# Patient Record
Sex: FEMALE | Race: WHITE | NOT HISPANIC OR LATINO | Employment: FULL TIME | URBAN - METROPOLITAN AREA
[De-identification: names, ages, dates, MRNs, and addresses within clinical notes are randomized per-mention and may not be internally consistent; named-entity substitution may affect disease eponyms.]

---

## 2017-03-03 ENCOUNTER — HOSPITAL ENCOUNTER (EMERGENCY)
Facility: HOSPITAL | Age: 47
Discharge: HOME | End: 2017-03-03
Payer: OTHER MISCELLANEOUS

## 2017-03-03 VITALS
OXYGEN SATURATION: 97 % | WEIGHT: 180 LBS | DIASTOLIC BLOOD PRESSURE: 83 MMHG | RESPIRATION RATE: 18 BRPM | SYSTOLIC BLOOD PRESSURE: 129 MMHG | TEMPERATURE: 97.3 F | HEART RATE: 75 BPM | HEIGHT: 64 IN | BODY MASS INDEX: 30.73 KG/M2

## 2017-03-03 DIAGNOSIS — S05.01XA RIGHT CORNEAL ABRASION: Primary | ICD-10-CM

## 2017-03-03 PROCEDURE — 99283 EMERGENCY DEPT VISIT LOW MDM: CPT

## 2017-03-03 RX ORDER — ESZOPICLONE 3 MG/1
3 TABLET, FILM COATED ORAL
COMMUNITY

## 2017-03-03 RX ORDER — PROPARACAINE HYDROCHLORIDE 5 MG/ML
1 SOLUTION/ DROPS OPHTHALMIC ONCE
Status: COMPLETED | OUTPATIENT
Start: 2017-03-03 | End: 2017-03-03

## 2017-03-03 RX ORDER — TOBRAMYCIN 3 MG/ML
1 SOLUTION/ DROPS OPHTHALMIC
Qty: 1.8 ML | Refills: 0 | Status: SHIPPED | OUTPATIENT
Start: 2017-03-03 | End: 2017-03-10

## 2017-03-03 RX ORDER — PURIFIED WATER 986 MG/ML
SOLUTION OPHTHALMIC ONCE
Status: COMPLETED | OUTPATIENT
Start: 2017-03-03 | End: 2017-03-03

## 2017-03-03 RX ADMIN — PURIFIED WATER: 986 SOLUTION OPHTHALMIC at 11:13

## 2017-03-03 RX ADMIN — FLUORESCEIN SODIUM 1 STRIP: 1 STRIP OPHTHALMIC at 11:12

## 2017-03-03 RX ADMIN — PROPARACAINE HYDROCHLORIDE 1 DROP: 5 SOLUTION/ DROPS OPHTHALMIC at 11:12

## 2019-12-21 ENCOUNTER — APPOINTMENT (OUTPATIENT)
Dept: RADIOLOGY | Facility: CLINIC | Age: 49
End: 2019-12-21
Payer: COMMERCIAL

## 2019-12-21 ENCOUNTER — OFFICE VISIT (OUTPATIENT)
Dept: URGENT CARE | Facility: CLINIC | Age: 49
End: 2019-12-21
Payer: COMMERCIAL

## 2019-12-21 VITALS
RESPIRATION RATE: 16 BRPM | BODY MASS INDEX: 32.44 KG/M2 | OXYGEN SATURATION: 100 % | WEIGHT: 190 LBS | DIASTOLIC BLOOD PRESSURE: 78 MMHG | HEART RATE: 80 BPM | SYSTOLIC BLOOD PRESSURE: 128 MMHG | HEIGHT: 64 IN | TEMPERATURE: 98.2 F

## 2019-12-21 DIAGNOSIS — M25.561 ACUTE PAIN OF RIGHT KNEE: Primary | ICD-10-CM

## 2019-12-21 DIAGNOSIS — M25.561 ACUTE PAIN OF RIGHT KNEE: ICD-10-CM

## 2019-12-21 PROCEDURE — 73562 X-RAY EXAM OF KNEE 3: CPT

## 2019-12-21 PROCEDURE — G0382 LEV 3 HOSP TYPE B ED VISIT: HCPCS | Performed by: EMERGENCY MEDICINE

## 2019-12-21 RX ORDER — NAPROXEN 375 MG/1
375 TABLET ORAL
Qty: 21 TABLET | Refills: 0 | Status: SHIPPED | OUTPATIENT
Start: 2019-12-21 | End: 2020-05-19

## 2019-12-21 NOTE — PATIENT INSTRUCTIONS
Rest, wear immobilizer when up and about, Naprosyn 3 x a day, see orthopedists for follow-up next week

## 2019-12-21 NOTE — PROGRESS NOTES
Assessment/Plan:    No problem-specific Assessment & Plan notes found for this encounter  Diagnoses and all orders for this visit:    Acute pain of right knee  -     XR knee 3 vw right non injury; Future          Subjective:      Patient ID: Dev Marc is a 44 y o  female  Pain in R knee, progressively worsening over past few weeks; Tender at medial jointline, No history of trauma  Minimal swelling    Knee Pain    The incident occurred more than 1 week ago  There was no injury mechanism  The pain is present in the right knee  The quality of the pain is described as aching  The pain is at a severity of 3/10  The pain is mild  The pain has been fluctuating since onset  Associated symptoms include an inability to bear weight  She reports no foreign bodies present  The symptoms are aggravated by movement  She has tried rest for the symptoms  The treatment provided mild relief  The following portions of the patient's history were reviewed and updated as appropriate: allergies, current medications, past family history, past medical history, past social history, past surgical history and problem list     Review of Systems   Musculoskeletal: Positive for arthralgias (R knee)  All other systems reviewed and are negative  Objective:      /78   Pulse 80   Temp 98 2 °F (36 8 °C)   Resp 16   Ht 5' 4" (1 626 m)   Wt 86 2 kg (190 lb)   SpO2 100%   BMI 32 61 kg/m²          Physical Exam   Constitutional: She is oriented to person, place, and time  She appears well-developed and well-nourished  HENT:   Nose: Nose normal    Eyes: Pupils are equal, round, and reactive to light  Neck: Normal range of motion  Cardiovascular: Normal rate  Pulmonary/Chest: Effort normal    Abdominal: Soft  Musculoskeletal:        Right knee: She exhibits swelling  Tenderness found  Medial joint line and MCL tenderness noted  Neurological: She is alert and oriented to person, place, and time     Skin: Skin is warm and dry  Psychiatric: She has a normal mood and affect  Her behavior is normal  Judgment and thought content normal    Nursing note and vitals reviewed

## 2020-01-28 ENCOUNTER — OFFICE VISIT (OUTPATIENT)
Dept: OBGYN CLINIC | Facility: CLINIC | Age: 50
End: 2020-01-28
Payer: COMMERCIAL

## 2020-01-28 VITALS
BODY MASS INDEX: 32.44 KG/M2 | HEART RATE: 65 BPM | DIASTOLIC BLOOD PRESSURE: 68 MMHG | WEIGHT: 190 LBS | HEIGHT: 64 IN | SYSTOLIC BLOOD PRESSURE: 120 MMHG

## 2020-01-28 DIAGNOSIS — M23.91 INTERNAL DERANGEMENT OF KNEE JOINT, RIGHT: Primary | ICD-10-CM

## 2020-01-28 PROCEDURE — 99203 OFFICE O/P NEW LOW 30 MIN: CPT | Performed by: ORTHOPAEDIC SURGERY

## 2020-01-28 NOTE — PROGRESS NOTES
Assessment:     1  Internal derangement of knee joint, right        Plan:     Problem List Items Addressed This Visit        Musculoskeletal and Integument    Internal derangement of knee joint, right - Primary     Findings and exam suspicious for medial meniscal tear of right knee  Medial joint line tenderness, + medial Maxwell  Will get MRI to further evaluate if she has medial menisca tear  If she has tear she may need arthroscopy, if no tear is present then would proceed with possible CSI, physical therapy  Ice and heat as needed, OTC NSAIDs avoid activities that aggravate the knee  See back for MRI review  All patient's questions were answered to her satisfaction  This note is created using dictation transcription  It may contain typographical errors, grammatical errors, improperly dictated words, background noise and other errors  Relevant Orders    MRI knee right  wo contrast          Subjective:     Patient ID: Nestor Howard is a 44 y o  female  Chief Complaint:  44 yr old female in for evaluation of right knee pain  Saw urgent care 21/21/20 due to increasing knee pain  Pain ongoing since last fall with no injury or trauma gradually worsening  Pain is medial based, worse with activity such as walking, stairs, squatting, pivoting on planted foot  Pain can be dull ache at rest, sharp pain with certain motions  No locking or catching  Pain will wake from sleep  She has had no formal treatment  Denies numbness or tingling in leg  Information on patient's intake form was reviewed  Allergy:  Allergies   Allergen Reactions    Percolone [Oxycodone] Itching     Medications:  all current active meds have been reviewed  Past Medical History:  History reviewed  No pertinent past medical history  Past Surgical History:  History reviewed  No pertinent surgical history  Family History:  History reviewed  No pertinent family history    Social History:  Social History     Substance and Sexual Activity Alcohol Use No     Social History     Substance and Sexual Activity   Drug Use No     Social History     Tobacco Use   Smoking Status Current Every Day Smoker    Packs/day: 0 50    Types: Cigarettes   Smokeless Tobacco Never Used     Review of Systems   Constitutional: Negative for chills and fever  HENT: Negative for drooling and sneezing  Eyes: Negative for redness  Respiratory: Negative for cough and wheezing  Cardiovascular: Negative  Gastrointestinal: Negative for nausea and vomiting  Genitourinary: Negative  Musculoskeletal: Positive for arthralgias (Right knee) and gait problem (Antalgic)  Negative for joint swelling  Neurological: Negative for weakness and numbness  Psychiatric/Behavioral: The patient is not nervous/anxious  Objective:  BP Readings from Last 1 Encounters:   01/28/20 120/68      Wt Readings from Last 1 Encounters:   01/28/20 86 2 kg (190 lb)      BMI:   Estimated body mass index is 32 61 kg/m² as calculated from the following:    Height as of this encounter: 5' 4" (1 626 m)  Weight as of this encounter: 86 2 kg (190 lb)  BSA:   Estimated body surface area is 1 91 meters squared as calculated from the following:    Height as of this encounter: 5' 4" (1 626 m)  Weight as of this encounter: 86 2 kg (190 lb)  Physical Exam   Constitutional: She is oriented to person, place, and time  She appears well-developed and well-nourished  HENT:   Head: Normocephalic and atraumatic  Eyes: Conjunctivae and EOM are normal    Neck: Neck supple  Pulmonary/Chest: Effort normal    Musculoskeletal:        Right knee: She exhibits no effusion  Neurological: She is alert and oriented to person, place, and time  She has normal reflexes  Skin: Skin is warm and dry  Psychiatric: She has a normal mood and affect  Her behavior is normal  Judgment and thought content normal    Nursing note and vitals reviewed      Right Knee Exam     Muscle Strength   The patient has normal right knee strength  Tenderness   The patient is experiencing tenderness in the medial joint line  Range of Motion   Extension: 0   Flexion: 130     Tests   Maxwell:  Medial - positive Lateral - negative  Varus: negative Valgus: negative  Patellar apprehension: negative    Other   Erythema: absent  Scars: absent  Sensation: normal  Pulse: present  Swelling: none  Effusion: no effusion present    Comments: Well tracking patella             I have personally reviewed pertinent films in PACS and my interpretation is XR right knee no acute osseus abnormality, no significant degenerative changes, lateral joint chondrocalcinosis       Scribe Attestation    I,:   Rico Vega am acting as a scribe while in the presence of the attending physician :        I,:   Ernestina Santoyo MD personally performed the services described in this documentation    as scribed in my presence :

## 2020-01-28 NOTE — ASSESSMENT & PLAN NOTE
Findings and exam suspicious for medial meniscal tear of right knee  Medial joint line tenderness, + medial Maxwell  Will get MRI to further evaluate if she has medial menisca tear  If she has tear she may need arthroscopy, if no tear is present then would proceed with possible CSI, physical therapy  Ice and heat as needed, OTC NSAIDs avoid activities that aggravate the knee  See back for MRI review  All patient's questions were answered to her satisfaction  This note is created using dictation transcription  It may contain typographical errors, grammatical errors, improperly dictated words, background noise and other errors

## 2020-02-03 ENCOUNTER — HOSPITAL ENCOUNTER (OUTPATIENT)
Dept: MRI IMAGING | Facility: HOSPITAL | Age: 50
Discharge: HOME | End: 2020-02-03
Attending: ORTHOPAEDIC SURGERY
Payer: COMMERCIAL

## 2020-02-03 DIAGNOSIS — M23.91 INTERNAL DERANGEMENT OF KNEE JOINT, RIGHT: ICD-10-CM

## 2020-02-03 PROCEDURE — 73721 MRI JNT OF LWR EXTRE W/O DYE: CPT

## 2020-02-06 ENCOUNTER — OFFICE VISIT (OUTPATIENT)
Dept: OBGYN CLINIC | Facility: CLINIC | Age: 50
End: 2020-02-06
Payer: COMMERCIAL

## 2020-02-06 VITALS
HEART RATE: 72 BPM | SYSTOLIC BLOOD PRESSURE: 120 MMHG | WEIGHT: 190 LBS | BODY MASS INDEX: 32.44 KG/M2 | DIASTOLIC BLOOD PRESSURE: 80 MMHG | HEIGHT: 64 IN

## 2020-02-06 DIAGNOSIS — M17.11 PRIMARY OSTEOARTHRITIS OF RIGHT KNEE: Primary | ICD-10-CM

## 2020-02-06 PROCEDURE — 20610 DRAIN/INJ JOINT/BURSA W/O US: CPT | Performed by: ORTHOPAEDIC SURGERY

## 2020-02-06 PROCEDURE — 99213 OFFICE O/P EST LOW 20 MIN: CPT | Performed by: ORTHOPAEDIC SURGERY

## 2020-02-06 RX ORDER — BETAMETHASONE SODIUM PHOSPHATE AND BETAMETHASONE ACETATE 3; 3 MG/ML; MG/ML
6 INJECTION, SUSPENSION INTRA-ARTICULAR; INTRALESIONAL; INTRAMUSCULAR; SOFT TISSUE
Status: COMPLETED | OUTPATIENT
Start: 2020-02-06 | End: 2020-02-06

## 2020-02-06 RX ORDER — LIDOCAINE HYDROCHLORIDE 10 MG/ML
7 INJECTION, SOLUTION EPIDURAL; INFILTRATION; INTRACAUDAL; PERINEURAL
Status: COMPLETED | OUTPATIENT
Start: 2020-02-06 | End: 2020-02-06

## 2020-02-06 RX ADMIN — BETAMETHASONE SODIUM PHOSPHATE AND BETAMETHASONE ACETATE 6 MG: 3; 3 INJECTION, SUSPENSION INTRA-ARTICULAR; INTRALESIONAL; INTRAMUSCULAR; SOFT TISSUE at 16:13

## 2020-02-06 RX ADMIN — LIDOCAINE HYDROCHLORIDE 7 ML: 10 INJECTION, SOLUTION EPIDURAL; INFILTRATION; INTRACAUDAL; PERINEURAL at 16:13

## 2020-02-06 NOTE — ASSESSMENT & PLAN NOTE
Findings consistent with right knee osteoarthritis possible degenerative medial meniscal tear  Discussed findings and treatment options with the patient  I reviewed patient's right knee MRI with her  I discussed prognosis of her knee condition  Since the MRI did not show a definitive meniscus tear other than signal changes  Patient would like to try conservative treatment with cortisone injection which I provided for her today and she tolerated well  Patient appeared to have good pain relief after the injection  We will see how patient responds to the treatment  I advised patient to avoid activities that may aggravate her knee  If patient continued to have mechanical symptoms, she may have to consider surgical treatment  Cold compress over the right knee today  All patient's questions were answered to her satisfaction  This note is created using dictation transcription  It may contain typographical errors, grammatical errors, improperly dictated words, background noise and other errors

## 2020-02-06 NOTE — PROGRESS NOTES
Assessment:     1  Primary osteoarthritis of right knee        Plan:     Problem List Items Addressed This Visit        Musculoskeletal and Integument    Primary osteoarthritis of right knee - Primary     Findings consistent with right knee osteoarthritis possible degenerative medial meniscal tear  Discussed findings and treatment options with the patient  I reviewed patient's right knee MRI with her  I discussed prognosis of her knee condition  Since the MRI did not show a definitive meniscus tear other than signal changes  Patient would like to try conservative treatment with cortisone injection which I provided for her today and she tolerated well  Patient appeared to have good pain relief after the injection  We will see how patient responds to the treatment  I advised patient to avoid activities that may aggravate her knee  If patient continued to have mechanical symptoms, she may have to consider surgical treatment  Cold compress over the right knee today  All patient's questions were answered to her satisfaction  This note is created using dictation transcription  It may contain typographical errors, grammatical errors, improperly dictated words, background noise and other errors  Relevant Medications    lidocaine (PF) (XYLOCAINE-MPF) 1 % injection 7 mL (Completed)    betamethasone acetate-betamethasone sodium phosphate (CELESTONE) injection 6 mg (Completed)    Other Relevant Orders    Ambulatory referral to Physical Therapy    Large joint arthrocentesis: R knee (Completed)         Subjective:     Patient ID: Maurice Arizmendi is a 44 y o  female  Chief Complaint:  66-year-old female follow-up right knee pain  Patient is here today to review her knee MRI  She continued to experiencing pain along the inner aspect of her right knee  She denies locking but has sensation of giving way due to pain  Pain is sharp at times    She has difficulty with squatting, kneeling, and climbing  Allergy:  Allergies   Allergen Reactions    Percolone [Oxycodone] Itching     Medications:  all current active meds have been reviewed  Past Medical History:  History reviewed  No pertinent past medical history  Past Surgical History:  History reviewed  No pertinent surgical history  Family History:  Family History   Problem Relation Age of Onset    No Known Problems Mother     No Known Problems Father      Social History:  Social History     Substance and Sexual Activity   Alcohol Use No     Social History     Substance and Sexual Activity   Drug Use No     Social History     Tobacco Use   Smoking Status Current Every Day Smoker    Packs/day: 0 50    Types: Cigarettes   Smokeless Tobacco Never Used     Review of Systems   Constitutional: Negative  HENT: Negative  Eyes: Negative  Respiratory: Negative  Cardiovascular: Negative  Gastrointestinal: Negative  Endocrine: Negative  Genitourinary: Negative  Musculoskeletal: Positive for arthralgias (right knee)  Negative for gait problem and joint swelling  Skin: Negative  Allergic/Immunologic: Negative  Neurological: Negative  Hematological: Negative  Psychiatric/Behavioral: Negative  Objective:  BP Readings from Last 1 Encounters:   02/06/20 120/80      Wt Readings from Last 1 Encounters:   02/06/20 86 2 kg (190 lb)      BMI:   Estimated body mass index is 32 61 kg/m² as calculated from the following:    Height as of this encounter: 5' 4" (1 626 m)  Weight as of this encounter: 86 2 kg (190 lb)  BSA:   Estimated body surface area is 1 91 meters squared as calculated from the following:    Height as of this encounter: 5' 4" (1 626 m)  Weight as of this encounter: 86 2 kg (190 lb)  Physical Exam   Constitutional: She is oriented to person, place, and time  She appears well-developed  HENT:   Head: Normocephalic and atraumatic  Eyes: Conjunctivae and EOM are normal    Neck: Neck supple  Pulmonary/Chest: Effort normal    Musculoskeletal:        Right knee: She exhibits no effusion  Neurological: She is alert and oriented to person, place, and time  Skin: Skin is warm  Psychiatric: She has a normal mood and affect  Nursing note and vitals reviewed  Right Knee Exam     Muscle Strength   The patient has normal right knee strength  Tenderness   The patient is experiencing tenderness in the medial joint line  Range of Motion   The patient has normal right knee ROM  Tests   Maxwell:  Medial - positive Lateral - negative  Varus: negative Valgus: negative  Patellar apprehension: negative    Other   Erythema: absent  Sensation: normal  Pulse: present  Swelling: none  Effusion: no effusion present              I have personally reviewed pertinent films in PACS and my interpretation is Right knee MRI show degenerative changes over the medial and patellofemoral compartment  There is slight signal changes over the body of medial meniscus       Large joint arthrocentesis: R knee  Date/Time: 2/6/2020 4:13 PM  Consent given by: patient  Site marked: site marked  Timeout: Immediately prior to procedure a time out was called to verify the correct patient, procedure, equipment, support staff and site/side marked as required   Supporting Documentation  Indications: pain   Procedure Details  Location: knee - R knee  Preparation: Patient was prepped and draped in the usual sterile fashion  Needle size: 22 G  Ultrasound guidance: no  Approach: anterolateral  Medications administered: 7 mL lidocaine (PF) 1 %; 6 mg betamethasone acetate-betamethasone sodium phosphate 6 (3-3) mg/mL    Patient tolerance: patient tolerated the procedure well with no immediate complications  Dressing:  Sterile dressing applied

## 2020-03-05 ENCOUNTER — OFFICE VISIT (OUTPATIENT)
Dept: OBGYN CLINIC | Facility: CLINIC | Age: 50
End: 2020-03-05
Payer: COMMERCIAL

## 2020-03-05 DIAGNOSIS — M23.203 DEGENERATIVE TEAR OF MEDIAL MENISCUS OF RIGHT KNEE: Primary | ICD-10-CM

## 2020-03-05 PROCEDURE — 99215 OFFICE O/P EST HI 40 MIN: CPT | Performed by: ORTHOPAEDIC SURGERY

## 2020-03-06 PROBLEM — M23.203 DEGENERATIVE TEAR OF MEDIAL MENISCUS OF RIGHT KNEE: Status: ACTIVE | Noted: 2020-03-06

## 2020-03-06 RX ORDER — CHLORHEXIDINE GLUCONATE 4 G/100ML
SOLUTION TOPICAL DAILY PRN
Status: CANCELLED | OUTPATIENT
Start: 2020-03-06

## 2020-03-06 NOTE — ASSESSMENT & PLAN NOTE
Findings consistent with right knee medial meniscus tear  Findings and treatment options were discussed with the patient  Patient had minimal relief with the cortisone injection  She continues to have mechanical symptoms in her right knee  Recommend right knee arthroscopy with partial medial meniscectomy  Risks, benefits and complications of surgery were discussed in detail by Dr Mary Justice and informed consent was obtained  All questions were answered to patient's satisfaction

## 2020-03-06 NOTE — PROGRESS NOTES
Assessment:     1  Degenerative tear of medial meniscus of right knee        Plan:  The patient was seen and examined by Dr Lj Chaney and myself  Problem List Items Addressed This Visit        Musculoskeletal and Integument    Degenerative tear of medial meniscus of right knee - Primary     Findings consistent with right knee medial meniscus tear  Findings and treatment options were discussed with the patient  Patient had minimal relief with the cortisone injection  She continues to have mechanical symptoms in her right knee  Recommend right knee arthroscopy with partial medial meniscectomy  Risks, benefits and complications of surgery were discussed in detail by Dr Lj Chaney and informed consent was obtained  All questions were answered to patient's satisfaction  Relevant Orders    Case request operating room: ARTHROSCOPY KNEE (Completed)    Comprehensive metabolic panel    CBC and differential         Subjective:     Patient ID: Anisa Otoole is a 44 y o  female  Chief Complaint:  61-year-old female following up for right knee osteoarthritis and possible medial meniscus tear  She was given a cortisone injection last visit and states she only had 24 hours a mild relief  She continues to have sharp pain over the medial aspect of her knee  It is worse with any squatting and twisting  Allergy:  Allergies   Allergen Reactions    Percolone [Oxycodone] Itching     Medications:  all current active meds have been reviewed  Past Medical History:  No past medical history on file  Past Surgical History:  No past surgical history on file    Family History:  Family History   Problem Relation Age of Onset    No Known Problems Mother     No Known Problems Father      Social History:  Social History     Substance and Sexual Activity   Alcohol Use No     Social History     Substance and Sexual Activity   Drug Use No     Social History     Tobacco Use   Smoking Status Current Every Day Smoker    Packs/day: 0 50  Types: Cigarettes   Smokeless Tobacco Never Used     Review of Systems   Constitutional: Negative  HENT: Negative  Eyes: Negative  Respiratory: Negative  Cardiovascular: Negative  Gastrointestinal: Negative  Endocrine: Negative  Genitourinary: Negative  Musculoskeletal: Positive for arthralgias (right knee)  Negative for gait problem and joint swelling  Skin: Negative  Allergic/Immunologic: Negative  Neurological: Negative  Hematological: Negative  Psychiatric/Behavioral: Negative  Objective:  BP Readings from Last 1 Encounters:   02/06/20 120/80      Wt Readings from Last 1 Encounters:   02/06/20 86 2 kg (190 lb)      BMI:   Estimated body mass index is 32 61 kg/m² as calculated from the following:    Height as of 2/6/20: 5' 4" (1 626 m)  Weight as of 2/6/20: 86 2 kg (190 lb)  BSA:   Estimated body surface area is 1 91 meters squared as calculated from the following:    Height as of 2/6/20: 5' 4" (1 626 m)  Weight as of 2/6/20: 86 2 kg (190 lb)  Physical Exam   Constitutional: She is oriented to person, place, and time  She appears well-developed  HENT:   Head: Normocephalic and atraumatic  Eyes: Conjunctivae and EOM are normal    Neck: Neck supple  Cardiovascular: Normal rate, regular rhythm and normal heart sounds  No murmur heard  Pulmonary/Chest: Effort normal and breath sounds normal  No respiratory distress  Musculoskeletal:        Right knee: She exhibits no effusion  Neurological: She is alert and oriented to person, place, and time  Skin: Skin is warm  Psychiatric: She has a normal mood and affect  Nursing note and vitals reviewed  Right Knee Exam     Muscle Strength   The patient has normal right knee strength  Tenderness   The patient is experiencing tenderness in the medial joint line  Range of Motion   The patient has normal right knee ROM      Tests   Maxwell:  Medial - positive Lateral - negative  Varus: negative Valgus: negative  Patellar apprehension: negative    Other   Erythema: absent  Sensation: normal  Pulse: present  Swelling: none  Effusion: no effusion present              No imaging today       Procedures

## 2020-03-25 ENCOUNTER — ANESTHESIA EVENT (OUTPATIENT)
Dept: PERIOP | Facility: HOSPITAL | Age: 50
End: 2020-03-25
Payer: COMMERCIAL

## 2020-03-27 ENCOUNTER — TELEPHONE (OUTPATIENT)
Dept: OBGYN CLINIC | Facility: CLINIC | Age: 50
End: 2020-03-27

## 2020-04-03 ENCOUNTER — ANESTHESIA (OUTPATIENT)
Dept: PERIOP | Facility: HOSPITAL | Age: 50
End: 2020-04-03
Payer: COMMERCIAL

## 2020-05-12 ENCOUNTER — OFFICE VISIT (OUTPATIENT)
Dept: OBGYN CLINIC | Facility: CLINIC | Age: 50
End: 2020-05-12
Payer: COMMERCIAL

## 2020-05-12 VITALS
DIASTOLIC BLOOD PRESSURE: 70 MMHG | SYSTOLIC BLOOD PRESSURE: 125 MMHG | TEMPERATURE: 97.8 F | BODY MASS INDEX: 32.44 KG/M2 | WEIGHT: 190 LBS | HEIGHT: 64 IN

## 2020-05-12 DIAGNOSIS — Z01.818 PREOP TESTING: ICD-10-CM

## 2020-05-12 DIAGNOSIS — M23.203 DEGENERATIVE TEAR OF MEDIAL MENISCUS OF RIGHT KNEE: Primary | ICD-10-CM

## 2020-05-12 PROCEDURE — 99215 OFFICE O/P EST HI 40 MIN: CPT | Performed by: ORTHOPAEDIC SURGERY

## 2020-05-15 ENCOUNTER — APPOINTMENT (OUTPATIENT)
Dept: LAB | Facility: HOSPITAL | Age: 50
End: 2020-05-15
Payer: COMMERCIAL

## 2020-05-15 DIAGNOSIS — M23.203 DEGENERATIVE TEAR OF MEDIAL MENISCUS OF RIGHT KNEE: ICD-10-CM

## 2020-05-15 DIAGNOSIS — Z01.818 PREOP TESTING: ICD-10-CM

## 2020-05-15 LAB
ALBUMIN SERPL BCP-MCNC: 3.9 G/DL (ref 3.5–5)
ALP SERPL-CCNC: 73 U/L (ref 46–116)
ALT SERPL W P-5'-P-CCNC: 40 U/L (ref 12–78)
ANION GAP SERPL CALCULATED.3IONS-SCNC: 3 MMOL/L (ref 4–13)
AST SERPL W P-5'-P-CCNC: 23 U/L (ref 5–45)
BASOPHILS # BLD AUTO: 0.05 THOUSANDS/ΜL (ref 0–0.1)
BASOPHILS NFR BLD AUTO: 1 % (ref 0–1)
BILIRUB SERPL-MCNC: 0.26 MG/DL (ref 0.2–1)
BUN SERPL-MCNC: 15 MG/DL (ref 5–25)
CALCIUM SERPL-MCNC: 9 MG/DL (ref 8.3–10.1)
CHLORIDE SERPL-SCNC: 108 MMOL/L (ref 100–108)
CO2 SERPL-SCNC: 27 MMOL/L (ref 21–32)
CREAT SERPL-MCNC: 0.76 MG/DL (ref 0.6–1.3)
EOSINOPHIL # BLD AUTO: 0.14 THOUSAND/ΜL (ref 0–0.61)
EOSINOPHIL NFR BLD AUTO: 2 % (ref 0–6)
ERYTHROCYTE [DISTWIDTH] IN BLOOD BY AUTOMATED COUNT: 13.1 % (ref 11.6–15.1)
GFR SERPL CREATININE-BSD FRML MDRD: 90 ML/MIN/1.73SQ M
GLUCOSE P FAST SERPL-MCNC: 119 MG/DL (ref 65–99)
HCT VFR BLD AUTO: 43.5 % (ref 34.8–46.1)
HGB BLD-MCNC: 13.6 G/DL (ref 11.5–15.4)
IMM GRANULOCYTES # BLD AUTO: 0.03 THOUSAND/UL (ref 0–0.2)
IMM GRANULOCYTES NFR BLD AUTO: 0 % (ref 0–2)
LYMPHOCYTES # BLD AUTO: 3.46 THOUSANDS/ΜL (ref 0.6–4.47)
LYMPHOCYTES NFR BLD AUTO: 42 % (ref 14–44)
MCH RBC QN AUTO: 27.4 PG (ref 26.8–34.3)
MCHC RBC AUTO-ENTMCNC: 31.3 G/DL (ref 31.4–37.4)
MCV RBC AUTO: 88 FL (ref 82–98)
MONOCYTES # BLD AUTO: 0.53 THOUSAND/ΜL (ref 0.17–1.22)
MONOCYTES NFR BLD AUTO: 6 % (ref 4–12)
NEUTROPHILS # BLD AUTO: 4.02 THOUSANDS/ΜL (ref 1.85–7.62)
NEUTS SEG NFR BLD AUTO: 49 % (ref 43–75)
NRBC BLD AUTO-RTO: 0 /100 WBCS
PLATELET # BLD AUTO: 306 THOUSANDS/UL (ref 149–390)
PMV BLD AUTO: 9.5 FL (ref 8.9–12.7)
POTASSIUM SERPL-SCNC: 4.7 MMOL/L (ref 3.5–5.3)
PROT SERPL-MCNC: 7.3 G/DL (ref 6.4–8.2)
RBC # BLD AUTO: 4.96 MILLION/UL (ref 3.81–5.12)
SODIUM SERPL-SCNC: 138 MMOL/L (ref 136–145)
WBC # BLD AUTO: 8.23 THOUSAND/UL (ref 4.31–10.16)

## 2020-05-15 PROCEDURE — 36415 COLL VENOUS BLD VENIPUNCTURE: CPT

## 2020-05-15 PROCEDURE — U0003 INFECTIOUS AGENT DETECTION BY NUCLEIC ACID (DNA OR RNA); SEVERE ACUTE RESPIRATORY SYNDROME CORONAVIRUS 2 (SARS-COV-2) (CORONAVIRUS DISEASE [COVID-19]), AMPLIFIED PROBE TECHNIQUE, MAKING USE OF HIGH THROUGHPUT TECHNOLOGIES AS DESCRIBED BY CMS-2020-01-R: HCPCS

## 2020-05-15 PROCEDURE — 85025 COMPLETE CBC W/AUTO DIFF WBC: CPT

## 2020-05-15 PROCEDURE — 80053 COMPREHEN METABOLIC PANEL: CPT

## 2020-05-16 LAB — SARS-COV-2 RNA RESP QL NAA+PROBE: NEGATIVE

## 2020-05-22 ENCOUNTER — HOSPITAL ENCOUNTER (OUTPATIENT)
Facility: HOSPITAL | Age: 50
Setting detail: OUTPATIENT SURGERY
Discharge: HOME | End: 2020-05-22
Attending: ORTHOPAEDIC SURGERY | Admitting: ORTHOPAEDIC SURGERY
Payer: COMMERCIAL

## 2020-05-22 VITALS
BODY MASS INDEX: 32.82 KG/M2 | SYSTOLIC BLOOD PRESSURE: 122 MMHG | HEIGHT: 65 IN | WEIGHT: 197 LBS | RESPIRATION RATE: 16 BRPM | TEMPERATURE: 97.5 F | HEART RATE: 67 BPM | OXYGEN SATURATION: 97 % | DIASTOLIC BLOOD PRESSURE: 78 MMHG

## 2020-05-22 DIAGNOSIS — M23.203 DEGENERATIVE TEAR OF MEDIAL MENISCUS OF RIGHT KNEE: Primary | ICD-10-CM

## 2020-05-22 LAB
EXT PREGNANCY TEST URINE: NEGATIVE
EXT. CONTROL: NORMAL

## 2020-05-22 PROCEDURE — 29881 ARTHRS KNE SRG MNISECTMY M/L: CPT | Performed by: ORTHOPAEDIC SURGERY

## 2020-05-22 PROCEDURE — NC001 PR NO CHARGE: Performed by: PHYSICIAN ASSISTANT

## 2020-05-22 PROCEDURE — 29881 ARTHRS KNE SRG MNISECTMY M/L: CPT | Performed by: PHYSICIAN ASSISTANT

## 2020-05-22 PROCEDURE — 81025 URINE PREGNANCY TEST: CPT | Performed by: ORTHOPAEDIC SURGERY

## 2020-05-22 RX ORDER — FENTANYL CITRATE/PF 50 MCG/ML
25 SYRINGE (ML) INJECTION
Status: DISCONTINUED | OUTPATIENT
Start: 2020-05-22 | End: 2020-05-22 | Stop reason: HOSPADM

## 2020-05-22 RX ORDER — PROPOFOL 10 MG/ML
INJECTION, EMULSION INTRAVENOUS AS NEEDED
Status: DISCONTINUED | OUTPATIENT
Start: 2020-05-22 | End: 2020-05-22 | Stop reason: SURG

## 2020-05-22 RX ORDER — CEFAZOLIN SODIUM 1 G/50ML
1000 SOLUTION INTRAVENOUS ONCE
Status: COMPLETED | OUTPATIENT
Start: 2020-05-22 | End: 2020-05-22

## 2020-05-22 RX ORDER — MIDAZOLAM HYDROCHLORIDE 2 MG/2ML
INJECTION, SOLUTION INTRAMUSCULAR; INTRAVENOUS AS NEEDED
Status: DISCONTINUED | OUTPATIENT
Start: 2020-05-22 | End: 2020-05-22 | Stop reason: SURG

## 2020-05-22 RX ORDER — DEXAMETHASONE SODIUM PHOSPHATE 10 MG/ML
INJECTION, SOLUTION INTRAMUSCULAR; INTRAVENOUS AS NEEDED
Status: DISCONTINUED | OUTPATIENT
Start: 2020-05-22 | End: 2020-05-22 | Stop reason: SURG

## 2020-05-22 RX ORDER — FENTANYL CITRATE 50 UG/ML
INJECTION, SOLUTION INTRAMUSCULAR; INTRAVENOUS AS NEEDED
Status: DISCONTINUED | OUTPATIENT
Start: 2020-05-22 | End: 2020-05-22 | Stop reason: SURG

## 2020-05-22 RX ORDER — HYDROCODONE BITARTRATE AND ACETAMINOPHEN 5; 325 MG/1; MG/1
1 TABLET ORAL EVERY 4 HOURS PRN
Qty: 20 TABLET | Refills: 0 | Status: SHIPPED | OUTPATIENT
Start: 2020-05-22 | End: 2020-06-01

## 2020-05-22 RX ORDER — ONDANSETRON 2 MG/ML
INJECTION INTRAMUSCULAR; INTRAVENOUS AS NEEDED
Status: DISCONTINUED | OUTPATIENT
Start: 2020-05-22 | End: 2020-05-22 | Stop reason: SURG

## 2020-05-22 RX ORDER — KETOROLAC TROMETHAMINE 30 MG/ML
INJECTION, SOLUTION INTRAMUSCULAR; INTRAVENOUS AS NEEDED
Status: DISCONTINUED | OUTPATIENT
Start: 2020-05-22 | End: 2020-05-22 | Stop reason: SURG

## 2020-05-22 RX ORDER — SODIUM CHLORIDE, SODIUM LACTATE, POTASSIUM CHLORIDE, CALCIUM CHLORIDE 600; 310; 30; 20 MG/100ML; MG/100ML; MG/100ML; MG/100ML
75 INJECTION, SOLUTION INTRAVENOUS CONTINUOUS
Status: DISCONTINUED | OUTPATIENT
Start: 2020-05-22 | End: 2020-05-22 | Stop reason: HOSPADM

## 2020-05-22 RX ORDER — ONDANSETRON 2 MG/ML
4 INJECTION INTRAMUSCULAR; INTRAVENOUS EVERY 6 HOURS PRN
Status: DISCONTINUED | OUTPATIENT
Start: 2020-05-22 | End: 2020-05-22 | Stop reason: HOSPADM

## 2020-05-22 RX ORDER — BUPIVACAINE HYDROCHLORIDE AND EPINEPHRINE 5; 5 MG/ML; UG/ML
INJECTION, SOLUTION EPIDURAL; INTRACAUDAL; PERINEURAL AS NEEDED
Status: DISCONTINUED | OUTPATIENT
Start: 2020-05-22 | End: 2020-05-22 | Stop reason: HOSPADM

## 2020-05-22 RX ORDER — ONDANSETRON 2 MG/ML
4 INJECTION INTRAMUSCULAR; INTRAVENOUS ONCE
Status: DISCONTINUED | OUTPATIENT
Start: 2020-05-22 | End: 2020-05-22 | Stop reason: HOSPADM

## 2020-05-22 RX ORDER — ACETAMINOPHEN 325 MG/1
650 TABLET ORAL EVERY 6 HOURS PRN
Status: DISCONTINUED | OUTPATIENT
Start: 2020-05-22 | End: 2020-05-22 | Stop reason: HOSPADM

## 2020-05-22 RX ORDER — METOCLOPRAMIDE HYDROCHLORIDE 5 MG/ML
10 INJECTION INTRAMUSCULAR; INTRAVENOUS ONCE
Status: DISCONTINUED | OUTPATIENT
Start: 2020-05-22 | End: 2020-05-22 | Stop reason: HOSPADM

## 2020-05-22 RX ORDER — CHLORHEXIDINE GLUCONATE 4 G/100ML
SOLUTION TOPICAL DAILY PRN
Status: DISCONTINUED | OUTPATIENT
Start: 2020-05-22 | End: 2020-05-22 | Stop reason: HOSPADM

## 2020-05-22 RX ADMIN — KETOROLAC TROMETHAMINE 30 MG: 30 INJECTION, SOLUTION INTRAMUSCULAR; INTRAVENOUS at 07:54

## 2020-05-22 RX ADMIN — ACETAMINOPHEN 650 MG: 325 TABLET ORAL at 09:05

## 2020-05-22 RX ADMIN — CEFAZOLIN SODIUM 1000 MG: 1 SOLUTION INTRAVENOUS at 07:29

## 2020-05-22 RX ADMIN — FENTANYL CITRATE 25 MCG: 50 INJECTION, SOLUTION INTRAMUSCULAR; INTRAVENOUS at 08:50

## 2020-05-22 RX ADMIN — ONDANSETRON 4 MG: 2 INJECTION INTRAMUSCULAR; INTRAVENOUS at 07:53

## 2020-05-22 RX ADMIN — DEXAMETHASONE SODIUM PHOSPHATE 4 MG: 10 INJECTION, SOLUTION INTRAMUSCULAR; INTRAVENOUS at 07:45

## 2020-05-22 RX ADMIN — FENTANYL CITRATE 25 MCG: 50 INJECTION, SOLUTION INTRAMUSCULAR; INTRAVENOUS at 08:55

## 2020-05-22 RX ADMIN — SODIUM CHLORIDE, SODIUM LACTATE, POTASSIUM CHLORIDE, AND CALCIUM CHLORIDE 75 ML/HR: .6; .31; .03; .02 INJECTION, SOLUTION INTRAVENOUS at 06:37

## 2020-05-22 RX ADMIN — FENTANYL CITRATE 25 MCG: 50 INJECTION, SOLUTION INTRAMUSCULAR; INTRAVENOUS at 07:45

## 2020-05-22 RX ADMIN — PROPOFOL 200 MG: 10 INJECTION, EMULSION INTRAVENOUS at 07:34

## 2020-05-22 RX ADMIN — MIDAZOLAM 2 MG: 1 INJECTION INTRAMUSCULAR; INTRAVENOUS at 07:29

## 2020-05-22 RX ADMIN — FENTANYL CITRATE 25 MCG: 50 INJECTION, SOLUTION INTRAMUSCULAR; INTRAVENOUS at 07:50

## 2020-05-22 RX ADMIN — FENTANYL CITRATE 50 MCG: 50 INJECTION, SOLUTION INTRAMUSCULAR; INTRAVENOUS at 07:29

## 2020-06-05 ENCOUNTER — OFFICE VISIT (OUTPATIENT)
Dept: OBGYN CLINIC | Facility: CLINIC | Age: 50
End: 2020-06-05

## 2020-06-05 VITALS
SYSTOLIC BLOOD PRESSURE: 118 MMHG | DIASTOLIC BLOOD PRESSURE: 72 MMHG | HEIGHT: 65 IN | BODY MASS INDEX: 32.82 KG/M2 | WEIGHT: 197 LBS | TEMPERATURE: 97.8 F

## 2020-06-05 DIAGNOSIS — Z47.89 AFTERCARE FOLLOWING SURGERY OF THE MUSCULOSKELETAL SYSTEM: Primary | ICD-10-CM

## 2020-06-05 PROCEDURE — 99024 POSTOP FOLLOW-UP VISIT: CPT | Performed by: ORTHOPAEDIC SURGERY

## 2020-10-27 ENCOUNTER — APPOINTMENT (OUTPATIENT)
Dept: RADIOLOGY | Facility: CLINIC | Age: 50
End: 2020-10-27
Payer: OTHER MISCELLANEOUS

## 2020-10-27 ENCOUNTER — OCCMED (OUTPATIENT)
Dept: URGENT CARE | Facility: CLINIC | Age: 50
End: 2020-10-27
Payer: OTHER MISCELLANEOUS

## 2020-10-27 DIAGNOSIS — R51.9 FACE PAIN: ICD-10-CM

## 2020-10-27 DIAGNOSIS — R51.9 FACE PAIN: Primary | ICD-10-CM

## 2020-10-27 PROCEDURE — G0382 LEV 3 HOSP TYPE B ED VISIT: HCPCS | Performed by: FAMILY MEDICINE

## 2020-10-27 PROCEDURE — 99283 EMERGENCY DEPT VISIT LOW MDM: CPT | Performed by: FAMILY MEDICINE

## 2020-10-27 PROCEDURE — 70160 X-RAY EXAM OF NASAL BONES: CPT

## 2025-06-03 ENCOUNTER — OFFICE VISIT (OUTPATIENT)
Dept: OBGYN CLINIC | Facility: CLINIC | Age: 55
End: 2025-06-03
Payer: COMMERCIAL

## 2025-06-03 ENCOUNTER — TELEPHONE (OUTPATIENT)
Dept: OBGYN CLINIC | Facility: CLINIC | Age: 55
End: 2025-06-03

## 2025-06-03 ENCOUNTER — PROCEDURE VISIT (OUTPATIENT)
Dept: OBGYN CLINIC | Facility: CLINIC | Age: 55
End: 2025-06-03
Payer: COMMERCIAL

## 2025-06-03 VITALS
DIASTOLIC BLOOD PRESSURE: 76 MMHG | HEIGHT: 65 IN | BODY MASS INDEX: 31.49 KG/M2 | SYSTOLIC BLOOD PRESSURE: 116 MMHG | WEIGHT: 189 LBS

## 2025-06-03 DIAGNOSIS — Z01.419 ENCOUNTER FOR ANNUAL ROUTINE GYNECOLOGICAL EXAMINATION: ICD-10-CM

## 2025-06-03 DIAGNOSIS — N90.89 VULVAR MASS: Primary | ICD-10-CM

## 2025-06-03 DIAGNOSIS — N90.89 VULVAR LESION: ICD-10-CM

## 2025-06-03 DIAGNOSIS — Z13.820 SCREENING FOR OSTEOPOROSIS: ICD-10-CM

## 2025-06-03 DIAGNOSIS — Z12.31 ENCOUNTER FOR SCREENING MAMMOGRAM FOR MALIGNANT NEOPLASM OF BREAST: Primary | ICD-10-CM

## 2025-06-03 PROCEDURE — S0610 ANNUAL GYNECOLOGICAL EXAMINA: HCPCS | Performed by: NURSE PRACTITIONER

## 2025-06-03 RX ORDER — MULTIVITAMIN
1 TABLET ORAL DAILY
COMMUNITY

## 2025-06-03 NOTE — TELEPHONE ENCOUNTER
Please call patient and advise her that bone density study ordered to screen for osteoporosis after she left her visit. She should call to schedule this when she schedules her mammogram.

## 2025-06-03 NOTE — PROGRESS NOTES
Biopsy    Date/Time: 6/3/2025 3:30 PM    Performed by: Zahra MESA DO  Authorized by: Zahra MESA DO    Universal Protocol:  procedure performed by consultantConsent: Verbal consent obtained  Risks and benefits: risks, benefits and alternatives were discussed  Consent given by: patient  Patient understanding: patient states understanding of the procedure being performed  Required items: required blood products, implants, devices, and special equipment available  Patient identity confirmed: verbally with patient    Procedure Details - Lesion Biopsy:     Skin lesion 1 location: labia.    Biopsy method: punch biopsy      Biopsy tissue type: skin    Initial size (mm):  5    Final defect size (mm):  1    Malignancy: benign lesion

## 2025-06-03 NOTE — TELEPHONE ENCOUNTER
Phone call to patient and reviewed provider ordered Dexa scan that can be scheduled along with mammo, order in chart.   Patient verbalzied understanding and very thankful for follow up, will call for scheduling.

## 2025-06-03 NOTE — PROGRESS NOTES
[facility] OB/GYN - [id number] [place] [address]    ASSESSMENT/PLAN: Cindy Spicer is a 55 y.o.  who presents for annual gynecologic exam.    Encounter for routine gynecologic examination  - Routine well gynecologic exam completed today.  - Cervical Cancer Screening: Current ASCCP Guidelines reviewed. Last Pap: Not on file . History of abnormal: Yes, HPV treated surgically.  - STI screening offered including HIV testing: offered, pt declined  - Breast Cancer Screening: Last Mammogram Not on file Has not had mammogram is several years.   - Colorectal cancer screening was not ordered.  - Osteoporosis screening:   The patient is at increased risk of osteoporosis based on history of smoking. Screening with DXA is  recommended.  - The following were reviewed in today's visit: breast self exam, mammography screening ordered, osteoporosis, adequate intake of calcium and vitamin D, exercise, healthy diet, tobacco cessation, and DEXA ordered    Additional problems addressed during this visit:  Assessment & Plan  Encounter for screening mammogram for malignant neoplasm of breast    Orders:    Mammo screening bilateral w 3d and cad; Future    Encounter for annual routine gynecological examination    Orders:    IGP, Aptima HPV, Rfx 16/18,45    Vulvar lesion  Appearance consistent with HPV infection, hyperpigmented area noted, needs biopsy.   Pt interested in biopsy at this time and appreciative, placed on Dr. Michaud's schedule for vulvar biopsy today.   Discussed may receive a bill due to same day. Verbalizes understanding.       Screening for osteoporosis  Baseline bone density study ordered.   Orders:    DXA bone density spine hip and pelvis; Future    CC:  Annual Gynecologic Examination    HPI: Cindy Spicer is a 55 y.o.  who presents for annual gynecologic examination. Pt concerned with hard, white area near clitoris. Hasn't seen gyn in several years. History of HPV, treated surgically. She does  "report normal pap smears in most recent years. She is not sexually active. She is past due for mammogram. She is menopausal since late 40s. She does see her PCP for routine care. 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      ROS: Negative except as noted in HPI    No LMP recorded. Patient is postmenopausal.       She  reports that she is not currently sexually active.       The following portions of the patient's history were reviewed and updated as appropriate:  Past Medical History[1]  Past Surgical History[2]  Family History[3]  Social History[4]  Outpatient Medications Marked as Taking for the 6/3/25 encounter (Office Visit) with DEBBIE Ghotra   Medication    eszopiclone (LUNESTA) tablet    Multiple Vitamin (multivitamin) tablet    VITAMIN D, CHOLECALCIFEROL, PO     Allergies[5]        Objective:  /76 (BP Location: Left arm, Patient Position: Sitting, Cuff Size: Large)   Ht 5' 5\" (1.651 m)   Wt 85.7 kg (189 lb)   BMI 31.45 kg/m²        Chaperone present? Declines.    General Appearance: alert and oriented, in no acute distress.   Neck/Thyroid: No thyromegaly, no thyroid nodules.  Respiratory: Unlabored breathing.  Cardiovascular: Regular rate, no peripheral edema.  Abdomen: Soft, non-tender, non-distended, no masses, no rebound or guarding.  Breast Exam: No dimpling, nipple retraction or discharge. No lumps or masses. No axillary or supraclavicular nodes.   Pelvic:       External genitalia: large white irregular growth noted at clitoral area, with dark pigmented area noted beneath it. No tenderness or exudate.  Normal Bartholin's and [place]'s.      Urinary system: Urethral meatus normal, bladder non-tender.      Vaginal: normal mucosa without prolapse or lesions. Normal-appearing physiologic discharge.      Cervix: Normal-appearing, well-epithelialized, no gross lesions or masses. No cervical motion tenderness.      Adnexa: No adnexal masses or tenderness noted.      Uterus: Normal-sized, " regular contour, midline, mobile, no uterine tenderness.  Rectovaginal: Normal externally, internal exam not performed.    Extremities: Normal range of motion. Warm, well-perfused, non-tender.   Skin: normal, no rash or abnormalities  Neurologic: alert, oriented x3  Psychiatric: Appropriate affect, mood stable, cooperative with exam.    DEBBIE Ghotra  6/3/2025 3:13 PM       [1]   Past Medical History:  Diagnosis Date    Abnormal Pap smear of cervix 2003    HPV laser survery    DVT (deep vein thrombosis) in pregnancy     HPV (human papilloma virus) infection     Migraines     PONV (postoperative nausea and vomiting)    [2]   Past Surgical History:  Procedure Laterality Date    ABLATION SOFT TISSUE      HPV ablation    APPENDECTOMY      BREAST BIOPSY  2004    Clear    BREAST SURGERY      cyst removal    CONDYLOMA EXCISION/FULGURATION  2004    [place] ARTHRS KNE SURG W/MENISCECTOMY MED/LAT W/SHVG Right 05/22/2020    Procedure: ARTHROSCOPY KNEE, partial medial menisectomy;  Surgeon: Augusto Ford MD;  Location:  MAIN OR;  Service: Orthopedics    WISDOM TOOTH EXTRACTION     [3]   Family History  Problem Relation Name Age of Onset    Cancer Mother Lidia     Deep vein thrombosis Father Rosalio    [4]   Social History  Socioeconomic History    Marital status:    Tobacco Use    Smoking status: Every Day     Current packs/day: 0.50     Average packs/day: 0.5 packs/day for 40.0 years (20.0 ttl pk-yrs)     Types: Cigarettes    Smokeless tobacco: Never   Vaping Use    Vaping status: Never Used   Substance and Sexual Activity    Alcohol use: No    Drug use: No    Sexual activity: Not Currently   [5]   Allergies  Allergen Reactions    Morphine Hives    Percolone [Oxycodone] Itching    Propoxyphene Hives

## 2025-06-06 LAB
CYTOLOGIST CVX/VAG CYTO: NORMAL
DX ICD CODE: NORMAL
HPV GENOTYPE REFLEX: NORMAL
HPV I/H RISK 4 DNA CVX QL PROBE+SIG AMP: NEGATIVE
OTHER STN SPEC: NORMAL
PATH REPORT.FINAL DX SPEC: NORMAL
SL AMB NOTE:: NORMAL
SL AMB SPECIMEN ADEQUACY: NORMAL
SL AMB TEST METHODOLOGY: NORMAL

## 2025-06-09 ENCOUNTER — RESULTS FOLLOW-UP (OUTPATIENT)
Dept: OBGYN CLINIC | Facility: CLINIC | Age: 55
End: 2025-06-09

## 2025-06-09 DIAGNOSIS — N90.1 VULVAR INTRAEPITHELIAL NEOPLASIA (VIN) GRADE 2: Primary | ICD-10-CM

## 2025-06-09 LAB
PATH REPORT.SITE OF ORIGIN SPEC: NORMAL
PAYMENT PROCEDURE: NORMAL
SL AMB .: NORMAL

## 2025-06-10 ENCOUNTER — RESULTS FOLLOW-UP (OUTPATIENT)
Dept: OBGYN CLINIC | Facility: CLINIC | Age: 55
End: 2025-06-10

## 2025-06-11 ENCOUNTER — HOSPITAL ENCOUNTER (OUTPATIENT)
Dept: BONE DENSITY | Facility: CLINIC | Age: 55
Discharge: HOME | End: 2025-06-11
Payer: COMMERCIAL

## 2025-06-11 ENCOUNTER — HOSPITAL ENCOUNTER (OUTPATIENT)
Dept: MAMMOGRAPHY | Facility: CLINIC | Age: 55
Discharge: HOME | End: 2025-06-11
Payer: COMMERCIAL

## 2025-06-11 VITALS — BODY MASS INDEX: 32.44 KG/M2 | HEIGHT: 64 IN | WEIGHT: 190 LBS

## 2025-06-11 DIAGNOSIS — Z13.820 SCREENING FOR OSTEOPOROSIS: ICD-10-CM

## 2025-06-11 DIAGNOSIS — Z12.31 ENCOUNTER FOR SCREENING MAMMOGRAM FOR MALIGNANT NEOPLASM OF BREAST: ICD-10-CM

## 2025-06-11 PROCEDURE — 77063 BREAST TOMOSYNTHESIS BI: CPT

## 2025-06-11 PROCEDURE — 77067 SCR MAMMO BI INCL CAD: CPT

## 2025-06-11 PROCEDURE — 77080 DXA BONE DENSITY AXIAL: CPT

## 2025-06-19 ENCOUNTER — CONSULT (OUTPATIENT)
Age: 55
End: 2025-06-19
Attending: OBSTETRICS & GYNECOLOGY
Payer: COMMERCIAL

## 2025-06-19 VITALS
OXYGEN SATURATION: 96 % | HEART RATE: 85 BPM | BODY MASS INDEX: 32.3 KG/M2 | TEMPERATURE: 98.2 F | SYSTOLIC BLOOD PRESSURE: 122 MMHG | HEIGHT: 64 IN | DIASTOLIC BLOOD PRESSURE: 86 MMHG | RESPIRATION RATE: 16 BRPM | WEIGHT: 189.2 LBS

## 2025-06-19 DIAGNOSIS — N90.1 VULVAR INTRAEPITHELIAL NEOPLASIA (VIN) GRADE 2: Primary | ICD-10-CM

## 2025-06-19 PROCEDURE — 99203 OFFICE O/P NEW LOW 30 MIN: CPT | Performed by: OBSTETRICS & GYNECOLOGY

## 2025-06-19 RX ORDER — IMIQUIMOD 12.5 MG/.25G
1 CREAM TOPICAL 2 TIMES WEEKLY
Qty: 12 EACH | Refills: 3 | Status: SHIPPED | OUTPATIENT
Start: 2025-06-19

## 2025-06-19 NOTE — PROGRESS NOTES
Name: Cindy Spicer      : 1970      MRN: [id number]  Encounter Provider: Amilcar Vallecillo MD  Encounter Date: 2025   Encounter department: [facility] GYNECOLOGY ONCOLOGY [facility]  :  Assessment & Plan  Vulvar intraepithelial neoplasia (AQUILES) grade 2  55-year-old with recurrent biopsy-proven AQUILSE 2 of the anterior vulva/clitoris measuring 3 x 3 cm.  Her performance status is 0.  1.  We discussed the pathophysiology of vulvar dysplasia including HPV mediated vulvar dysplasia and dysplasia mediated by chronic inflammation.  Based on history, it is likely she has HPV mediated dysplasia.  2.  I discussed treatment options for AQUILES 2 including surgical resection which has the benefits of a final pathologic diagnosis, lowest risk of recurrence.  Given that the dysplasia includes the clitoris, she would prefer an alternative treatment.  3.  I discussed the risks and benefits of utilizing Aldara cream twice weekly for 16 weeks.  She understands the risks including skin irritation, swelling, redness.  She understands that the pathologic complete response rate is approximately 50%.  She is interested in trying Aldara.  She will return in 2 months for evaluation while on treatment.  4.  We discussed utilizing CO2 laser therapy to preserve anatomy in the event that Aldara is ineffectual.  5.  Surgical resection can be reserved for progression/recurrent disease which will preserve sexual function.  6.  We discussed tobacco cessation.  She has attempted this many times previously.  Thank you for the courtesy of this consultation.  All questions were answered by the end of the visit.  Orders:    Ambulatory Referral to Gynecologic Oncology    imiquimod (ALDARA) 5 % cream; Apply 1 packet topically 2 (two) times a week      Assessment & Plan            History of Present Illness   Reason for Visit / CC: Recurrent vulvar dysplasia   Cindy Spicer is a 55 y.o. female with history of  cervical dysplasia treated in 2003 with laser surgery who presented with a firm, white area near the clitoris.  She is also been treated for vulvar dysplasia that was HPV mediated with surgery and CO2 laser therapy.  She had a Pap, HPV testing and vulvar biopsy performed on 6/3/2025.  The vulvar biopsy revealed AQUILES 2.  The Pap was normal and HPV negative.  She presents as a consultation from DEBBIE Escobar to discuss treatment options.  She is a smoker.  She has vulvar irritation.  No postmenopausal bleeding.  She is performing her actives of daily living without difficulty.  History of Present Illness    Pertinent Medical History   Smoker          Review of Systems   Constitutional:  Negative for activity change and unexpected weight change.   HENT: Negative.     Eyes: Negative.    Respiratory: Negative.     Cardiovascular: Negative.    Gastrointestinal:  Negative for abdominal distention and abdominal pain.   Endocrine: Negative.    Genitourinary:  Positive for genital sores. Negative for pelvic pain and vaginal bleeding.   Musculoskeletal: Negative.    Skin: Negative.    Allergic/Immunologic: Negative.    Neurological: Negative.    Hematological: Negative.    Psychiatric/Behavioral: Negative.      A complete review of systems is negative other than that noted above in the HPI.  Past Medical History   Past Medical History[1]  Past Surgical History[2]  Family History[3]   reports that she has been smoking cigarettes. She has a 20 pack-year smoking history. She has never used smokeless tobacco. She reports that she does not drink alcohol and does not use drugs.  Current Outpatient Medications   Medication Instructions    eszopiclone (LUNESTA) 3 mg, Daily at bedtime    imiquimod (ALDARA) 5 % cream 1 packet, Topical, 2 times weekly    Multiple Vitamin (multivitamin) tablet 1 tablet, Daily    VITAMIN D, CHOLECALCIFEROL, PO Take by mouth   Allergies[4]      Objective   There were no vitals taken for this  visit.    There is no height or weight on file to calculate BMI.  Pain Screening:     ECOG   0  Physical Exam  Vitals reviewed. Exam conducted with a chaperone present.   Constitutional:       General: She is not in acute distress.     Appearance: Normal appearance. She is well-developed and normal weight. She is not ill-appearing, toxic-appearing or diaphoretic.   HENT:      Head: Normocephalic and atraumatic.     Eyes:      General: No scleral icterus.     Extraocular Movements: Extraocular movements intact.      Conjunctiva/sclera: Conjunctivae normal.     Neck:      Thyroid: No thyromegaly.     Cardiovascular:      Rate and Rhythm: Normal rate and regular rhythm.      Heart sounds: Normal heart sounds.   Pulmonary:      Effort: Respiratory distress present.      Breath sounds: No stridor. Wheezing present. No rhonchi.   Abdominal:      General: There is no distension.      Palpations: Abdomen is soft. There is no mass.      Tenderness: There is no abdominal tenderness. There is no guarding or rebound.      Hernia: No hernia is present.   Genitourinary:     Comments: There is a 3 x 3 cm white, papillary lesion of the anterior vulva including the clitoris and the midline.  No other visible lesions identified.    Musculoskeletal:         General: No swelling or tenderness.      Cervical back: Normal range of motion and neck supple.      Right lower leg: No edema.      Left lower leg: No edema.   Lymphadenopathy:      Cervical: No cervical adenopathy.     Skin:     General: Skin is warm and dry.      Coloration: Skin is not jaundiced or pale.      Findings: No lesion or rash.     Neurological:      General: No focal deficit present.      Mental Status: She is alert and oriented to person, place, and time. Mental status is at baseline.      Cranial Nerves: No cranial nerve deficit.      Motor: No weakness.      Gait: Gait normal.     Psychiatric:         Mood and Affect: Mood normal.         Behavior: Behavior  normal.         Thought Content: Thought content normal.         Judgment: Judgment normal.       Physical Exam       Results                     [1]   Past Medical History:  Diagnosis Date    Abnormal Pap smear of cervix 2003    HPV laser survery    DVT (deep vein thrombosis) in pregnancy     HPV (human papilloma virus) infection     Migraines     PONV (postoperative nausea and vomiting)    [2]   Past Surgical History:  Procedure Laterality Date    ABLATION SOFT TISSUE      HPV ablation    APPENDECTOMY      BREAST BIOPSY  2004    Clear    BREAST SURGERY      cyst removal    CONDYLOMA EXCISION/FULGURATION  2004    [place] ARTHRS KNE SURG W/MENISCECTOMY MED/LAT W/SHVG Right 05/22/2020    Procedure: ARTHROSCOPY KNEE, partial medial menisectomy;  Surgeon: Augusto Ford MD;  Location:  MAIN OR;  Service: Orthopedics    WISDOM TOOTH EXTRACTION     [3]   Family History  Problem Relation Name Age of Onset    Cancer Mother Lidia     Deep vein thrombosis Father Rosalio    [4]   Allergies  Allergen Reactions    Other Hives     Propoxyphene N-Acetaminophen  Darvocet-N and A    Morphine Hives    Oxycodone-Acetaminophen Anxiety, Hives and Other (See Comments)    Percolone [Oxycodone] Itching    Propoxyphene Hives

## 2025-06-19 NOTE — ASSESSMENT & PLAN NOTE
55-year-old with recurrent biopsy-proven AQUILES 2 of the anterior vulva/clitoris measuring 3 x 3 cm.  Her performance status is 0.  1.  We discussed the pathophysiology of vulvar dysplasia including HPV mediated vulvar dysplasia and dysplasia mediated by chronic inflammation.  Based on history, it is likely she has HPV mediated dysplasia.  2.  I discussed treatment options for AQUILES 2 including surgical resection which has the benefits of a final pathologic diagnosis, lowest risk of recurrence.  Given that the dysplasia includes the clitoris, she would prefer an alternative treatment.  3.  I discussed the risks and benefits of utilizing Aldara cream twice weekly for 16 weeks.  She understands the risks including skin irritation, swelling, redness.  She understands that the pathologic complete response rate is approximately 50%.  She is interested in trying Aldara.  She will return in 2 months for evaluation while on treatment.  4.  We discussed utilizing CO2 laser therapy to preserve anatomy in the event that Aldara is ineffectual.  5.  Surgical resection can be reserved for progression/recurrent disease which will preserve sexual function.  6.  We discussed tobacco cessation.  She has attempted this many times previously.  Thank you for the courtesy of this consultation.  All questions were answered by the end of the visit.  Orders:    Ambulatory Referral to Gynecologic Oncology    imiquimod (ALDARA) 5 % cream; Apply 1 packet topically 2 (two) times a week

## 2025-06-19 NOTE — LETTER
2025     DEBBIE Ghotra  [address] [address]    Patient: Cindy Spicer   YOB: 1970   Date of Visit: 2025       Dear DEBBIE Pozo MD:    Thank you for referring iCndy Spicer to me for evaluation. Below are my notes for this consultation.    If you have questions, please do not hesitate to call me. I look forward to following your patient along with you.         Sincerely,        Amilcar Vallecillo MD        CC: MD Amilcar Sorto MD  2025  4:31 PM  Sign when Signing Visit  Name: Cindy Spicer      : 1970      MRN: [id number]  Encounter Provider: Amilcar Vallecillo MD  Encounter Date: 2025   Encounter department: [facility] GYNECOLOGY ONCOLOGY [facility]  :  Assessment & Plan  Vulvar intraepithelial neoplasia (AQUILES) grade 2  55-year-old with recurrent biopsy-proven AQUILES 2 of the anterior vulva/clitoris measuring 3 x 3 cm.  Her performance status is 0.  1.  We discussed the pathophysiology of vulvar dysplasia including HPV mediated vulvar dysplasia and dysplasia mediated by chronic inflammation.  Based on history, it is likely she has HPV mediated dysplasia.  2.  I discussed treatment options for AQUILES 2 including surgical resection which has the benefits of a final pathologic diagnosis, lowest risk of recurrence.  Given that the dysplasia includes the clitoris, she would prefer an alternative treatment.  3.  I discussed the risks and benefits of utilizing Aldara cream twice weekly for 16 weeks.  She understands the risks including skin irritation, swelling, redness.  She understands that the pathologic complete response rate is approximately 50%.  She is interested in trying Aldara.  She will return in 2 months for evaluation while on treatment.  4.  We discussed utilizing CO2 laser therapy to preserve anatomy in the event  that Aldara is ineffectual.  5.  Surgical resection can be reserved for progression/recurrent disease which will preserve sexual function.  6.  We discussed tobacco cessation.  She has attempted this many times previously.  Thank you for the courtesy of this consultation.  All questions were answered by the end of the visit.  Orders:  •  Ambulatory Referral to Gynecologic Oncology  •  imiquimod (ALDARA) 5 % cream; Apply 1 packet topically 2 (two) times a week      Assessment & Plan            History of Present Illness  Reason for Visit / CC: Recurrent vulvar dysplasia   Cindy Spicer is a 55 y.o. female with history of cervical dysplasia treated in 2003 with laser surgery who presented with a firm, white area near the clitoris.  She is also been treated for vulvar dysplasia that was HPV mediated with surgery and CO2 laser therapy.  She had a Pap, HPV testing and vulvar biopsy performed on 6/3/2025.  The vulvar biopsy revealed AQUILES 2.  The Pap was normal and HPV negative.  She presents as a consultation from DEBBIE Escobar to discuss treatment options.  She is a smoker.  She has vulvar irritation.  No postmenopausal bleeding.  She is performing her actives of daily living without difficulty.  History of Present Illness    Pertinent Medical History  Smoker          Review of Systems   Constitutional:  Negative for activity change and unexpected weight change.   HENT: Negative.     Eyes: Negative.    Respiratory: Negative.     Cardiovascular: Negative.    Gastrointestinal:  Negative for abdominal distention and abdominal pain.   Endocrine: Negative.    Genitourinary:  Positive for genital sores. Negative for pelvic pain and vaginal bleeding.   Musculoskeletal: Negative.    Skin: Negative.    Allergic/Immunologic: Negative.    Neurological: Negative.    Hematological: Negative.    Psychiatric/Behavioral: Negative.      A complete review of systems is negative other than that noted above in the HPI.  Past Medical  History  Past Medical History[1]  Past Surgical History[2]  Family History[3]   reports that she has been smoking cigarettes. She has a 20 pack-year smoking history. She has never used smokeless tobacco. She reports that she does not drink alcohol and does not use drugs.  Current Outpatient Medications   Medication Instructions   • eszopiclone (LUNESTA) 3 mg, Daily at bedtime   • imiquimod (ALDARA) 5 % cream 1 packet, Topical, 2 times weekly   • Multiple Vitamin (multivitamin) tablet 1 tablet, Daily   • VITAMIN D, CHOLECALCIFEROL, PO Take by mouth   Allergies[4]      Objective  There were no vitals taken for this visit.    There is no height or weight on file to calculate BMI.  Pain Screening:     ECOG   0  Physical Exam  Vitals reviewed. Exam conducted with a chaperone present.   Constitutional:       General: She is not in acute distress.     Appearance: Normal appearance. She is well-developed and normal weight. She is not ill-appearing, toxic-appearing or diaphoretic.   HENT:      Head: Normocephalic and atraumatic.     Eyes:      General: No scleral icterus.     Extraocular Movements: Extraocular movements intact.      Conjunctiva/sclera: Conjunctivae normal.     Neck:      Thyroid: No thyromegaly.     Cardiovascular:      Rate and Rhythm: Normal rate and regular rhythm.      Heart sounds: Normal heart sounds.   Pulmonary:      Effort: Respiratory distress present.      Breath sounds: No stridor. Wheezing present. No rhonchi.   Abdominal:      General: There is no distension.      Palpations: Abdomen is soft. There is no mass.      Tenderness: There is no abdominal tenderness. There is no guarding or rebound.      Hernia: No hernia is present.   Genitourinary:     Comments: There is a 3 x 3 cm white, papillary lesion of the anterior vulva including the clitoris and the midline.  No other visible lesions identified.    Musculoskeletal:         General: No swelling or tenderness.      Cervical back: Normal range  of motion and neck supple.      Right lower leg: No edema.      Left lower leg: No edema.   Lymphadenopathy:      Cervical: No cervical adenopathy.     Skin:     General: Skin is warm and dry.      Coloration: Skin is not jaundiced or pale.      Findings: No lesion or rash.     Neurological:      General: No focal deficit present.      Mental Status: She is alert and oriented to person, place, and time. Mental status is at baseline.      Cranial Nerves: No cranial nerve deficit.      Motor: No weakness.      Gait: Gait normal.     Psychiatric:         Mood and Affect: Mood normal.         Behavior: Behavior normal.         Thought Content: Thought content normal.         Judgment: Judgment normal.       Physical Exam       Results                     [1]   Past Medical History:  Diagnosis Date   • Abnormal Pap smear of cervix 2003    HPV laser survery   • DVT (deep vein thrombosis) in pregnancy    • HPV (human papilloma virus) infection    • Migraines    • PONV (postoperative nausea and vomiting)    [2]   Past Surgical History:  Procedure Laterality Date   • ABLATION SOFT TISSUE      HPV ablation   • APPENDECTOMY     • BREAST BIOPSY  2004    Clear   • BREAST SURGERY      cyst removal   • CONDYLOMA EXCISION/FULGURATION  2004   • [place] ARTHRS KNE SURG W/MENISCECTOMY MED/LAT W/SHVG Right 05/22/2020    Procedure: ARTHROSCOPY KNEE, partial medial menisectomy;  Surgeon: Augusto Ford MD;  Location:  MAIN OR;  Service: Orthopedics   • WISDOM TOOTH EXTRACTION     [3]   Family History  Problem Relation Name Age of Onset   • Cancer Mother Lidia    • Deep vein thrombosis Father Rosalio    [4]   Allergies  Allergen Reactions   • Other Hives     Propoxyphene N-Acetaminophen  Darvocet-N and A   • Morphine Hives   • Oxycodone-Acetaminophen Anxiety, Hives and Other (See Comments)   • Percolone [Oxycodone] Itching   • Propoxyphene Hives

## 2025-08-14 ENCOUNTER — OFFICE VISIT (OUTPATIENT)
Age: 55
End: 2025-08-14
Payer: COMMERCIAL

## (undated) DEVICE — INTENDED FOR TISSUE SEPARATION, AND OTHER PROCEDURES THAT REQUIRE A SHARP SURGICAL BLADE TO PUNCTURE OR CUT.: Brand: BARD-PARKER SAFETY BLADES SIZE 11, STERILE

## (undated) DEVICE — NEEDLE 18 G X 1 1/2

## (undated) DEVICE — GLOVE SRG BIOGEL ORTHOPEDIC 7.5

## (undated) DEVICE — FILTER STRAW 1.7

## (undated) DEVICE — BLADE SHAVER EXCALIBUR 4MM 13CM COOLCUT

## (undated) DEVICE — SYRINGE 30ML LL

## (undated) DEVICE — ACE WRAP 6 IN UNSTERILE

## (undated) DEVICE — CHLORAPREP HI-LITE 26ML ORANGE

## (undated) DEVICE — BETHLEHEM UNIVERSAL  ARTHRO PK: Brand: CARDINAL HEALTH

## (undated) DEVICE — SYRINGE 3ML LL

## (undated) DEVICE — TUBING ARTHROSCOPIC WAVE  MAIN PUMP

## (undated) DEVICE — GLOVE SRG BIOGEL 7.5

## (undated) DEVICE — IMPERVIOUS STOCKINETTE: Brand: DEROYAL

## (undated) DEVICE — 2000CC GUARDIAN II: Brand: GUARDIAN

## (undated) DEVICE — CAST PADDING 6 IN STERILE

## (undated) DEVICE — 3000CC GUARDIAN II: Brand: GUARDIAN

## (undated) DEVICE — SUT ETHILON 3-0 PS-1 18 IN 1663H

## (undated) DEVICE — CUFF TOURNIQUET 30 X 4 IN QUICK CONNECT DISP 1BLA

## (undated) DEVICE — GLOVE INDICATOR PI UNDERGLOVE SZ 8 BLUE

## (undated) DEVICE — TUBING SUCTION 5MM X 12 FT

## (undated) DEVICE — NEEDLE HYPO 22G X 1-1/2 IN

## (undated) DEVICE — OCCLUSIVE GAUZE STRIP,3% BISMUTH TRIBROMOPHENATE IN PETROLATUM BLEND: Brand: XEROFORM